# Patient Record
Sex: MALE | Race: WHITE | URBAN - METROPOLITAN AREA
[De-identification: names, ages, dates, MRNs, and addresses within clinical notes are randomized per-mention and may not be internally consistent; named-entity substitution may affect disease eponyms.]

---

## 2021-10-18 ENCOUNTER — OFFICE VISIT (OUTPATIENT)
Dept: URBAN - METROPOLITAN AREA CLINIC 121 | Facility: CLINIC | Age: 76
End: 2021-10-18

## 2022-04-15 ENCOUNTER — OFFICE VISIT (OUTPATIENT)
Dept: URBAN - METROPOLITAN AREA CLINIC 63 | Facility: CLINIC | Age: 77
End: 2022-04-15

## 2022-05-02 ENCOUNTER — OFFICE VISIT (OUTPATIENT)
Dept: URBAN - METROPOLITAN AREA SURGERY CENTER 4 | Facility: SURGERY CENTER | Age: 77
End: 2022-05-02

## 2022-05-04 LAB — PATHOLOGY (INDENTED REPORT): (no result)

## 2022-05-11 ENCOUNTER — OFFICE VISIT (OUTPATIENT)
Dept: URBAN - METROPOLITAN AREA CLINIC 63 | Facility: CLINIC | Age: 77
End: 2022-05-11

## 2022-07-09 ENCOUNTER — TELEPHONE ENCOUNTER (OUTPATIENT)
Dept: URBAN - METROPOLITAN AREA CLINIC 121 | Facility: CLINIC | Age: 77
End: 2022-07-09

## 2022-07-09 RX ORDER — OMEPRAZOLE 20 MG/1
CAPSULE, DELAYED RELEASE ORAL ONCE A DAY
Refills: 0 | OUTPATIENT
Start: 2022-04-15 | End: 2022-05-11

## 2022-07-09 RX ORDER — SIMVASTATIN 40 MG/1
TABLET, FILM COATED ORAL ONCE A DAY
Refills: 0 | OUTPATIENT
Start: 2022-04-15 | End: 2022-05-11

## 2022-07-09 RX ORDER — LEVOTHYROXINE SODIUM 100 UG/1
TABLET ORAL ONCE A DAY
Refills: 0 | OUTPATIENT
Start: 2022-04-15 | End: 2022-05-11

## 2022-07-09 RX ORDER — MELOXICAM 7.5 MG/1
TABLET ORAL ONCE A DAY
Refills: 0 | OUTPATIENT
Start: 2022-04-15 | End: 2022-05-11

## 2022-07-09 RX ORDER — DICLOFENAC SODIUM TOPICAL GEL, 1%, 10 MG/G
GEL TOPICAL
Refills: 0 | OUTPATIENT
Start: 2022-04-15 | End: 2022-05-11

## 2022-07-10 ENCOUNTER — TELEPHONE ENCOUNTER (OUTPATIENT)
Dept: URBAN - METROPOLITAN AREA CLINIC 121 | Facility: CLINIC | Age: 77
End: 2022-07-10

## 2022-07-10 RX ORDER — DICLOFENAC SODIUM TOPICAL GEL, 1%, 10 MG/G
GEL TOPICAL
Refills: 0 | Status: ACTIVE | COMMUNITY
Start: 2022-05-11

## 2022-07-10 RX ORDER — SIMVASTATIN 40 MG/1
TABLET, FILM COATED ORAL ONCE A DAY
Refills: 0 | Status: ACTIVE | COMMUNITY
Start: 2022-05-11

## 2022-07-10 RX ORDER — OMEPRAZOLE 20 MG/1
CAPSULE, DELAYED RELEASE ORAL ONCE A DAY
Refills: 0 | Status: ACTIVE | COMMUNITY
Start: 2022-05-11

## 2022-07-10 RX ORDER — LEVOTHYROXINE SODIUM 100 UG/1
TABLET ORAL ONCE A DAY
Refills: 0 | Status: ACTIVE | COMMUNITY
Start: 2022-05-11

## 2022-07-10 RX ORDER — MELOXICAM 7.5 MG/1
TABLET ORAL ONCE A DAY
Refills: 0 | Status: ACTIVE | COMMUNITY
Start: 2022-05-11

## 2022-08-02 ENCOUNTER — LAB OUTSIDE AN ENCOUNTER (OUTPATIENT)
Dept: URBAN - METROPOLITAN AREA CLINIC 63 | Facility: CLINIC | Age: 77
End: 2022-08-02

## 2022-08-03 LAB
A/G RATIO: 1.6
ABSOLUTE BASOPHILS: 53
ABSOLUTE EOSINOPHILS: 630
ABSOLUTE LYMPHOCYTES: 1943
ABSOLUTE MONOCYTES: 1092
ABSOLUTE NEUTROPHILS: 6783
ALBUMIN: 4.4
ALKALINE PHOSPHATASE: 65
ALT (SGPT): 24
AST (SGOT): 23
BASOPHILS: 0.5
BILIRUBIN, TOTAL: 0.8
BUN/CREATININE RATIO: 24
BUN: 16
CALCIUM: 9.6
CARBON DIOXIDE, TOTAL: 27
CHLORIDE: 102
CREATININE: 0.67
EGFR: 96
EOSINOPHILS: 6
GLOBULIN, TOTAL: 2.8
GLUCOSE: 104
HEMATOCRIT: 46.8
HEMOGLOBIN: 15.7
HEPATITIS A AB, TOTAL: (no result)
HEPATITIS B CORE AB TOTAL: (no result)
HEPATITIS B SURFACE ANTIGEN: (no result)
HEPATITIS B SURFACE: (no result)
HEPATITIS C ANTIBODY: (no result)
INDEX: 0.07
INR: 1.1
IRON BIND.CAP.(TIBC): 317
IRON SATURATION: 34
IRON: 107
LYMPHOCYTES: 18.5
MCH: 31.6
MCHC: 33.5
MCV: 94.2
MONOCYTES: 10.4
MPV: 11.4
NEUTROPHILS: 64.6
PLATELET COUNT: 282
POTASSIUM: 4.2
PROTEIN, TOTAL: 7.2
PT: 11.1
RDW: 13.9
RED BLOOD CELL COUNT: 4.97
REFLEX TIQ: (no result)
SODIUM: 139
WHITE BLOOD CELL COUNT: 10.5

## 2022-08-12 ENCOUNTER — OFFICE VISIT (OUTPATIENT)
Dept: URBAN - METROPOLITAN AREA CLINIC 63 | Facility: CLINIC | Age: 77
End: 2022-08-12
Payer: MEDICARE

## 2022-08-12 ENCOUNTER — WEB ENCOUNTER (OUTPATIENT)
Dept: URBAN - METROPOLITAN AREA CLINIC 63 | Facility: CLINIC | Age: 77
End: 2022-08-12

## 2022-08-12 VITALS
OXYGEN SATURATION: 97 % | RESPIRATION RATE: 16 BRPM | SYSTOLIC BLOOD PRESSURE: 128 MMHG | DIASTOLIC BLOOD PRESSURE: 68 MMHG | TEMPERATURE: 98.6 F | HEIGHT: 71 IN | WEIGHT: 205.9 LBS | BODY MASS INDEX: 28.82 KG/M2 | HEART RATE: 68 BPM

## 2022-08-12 DIAGNOSIS — K21.00 CHRONIC REFLUX ESOPHAGITIS: ICD-10-CM

## 2022-08-12 DIAGNOSIS — K59.04 CHRONIC IDIOPATHIC CONSTIPATION: ICD-10-CM

## 2022-08-12 DIAGNOSIS — K76.0 FATTY LIVER: ICD-10-CM

## 2022-08-12 PROCEDURE — 99214 OFFICE O/P EST MOD 30 MIN: CPT | Performed by: NURSE PRACTITIONER

## 2022-08-12 RX ORDER — OMEPRAZOLE 20 MG/1
CAPSULE, DELAYED RELEASE ORAL ONCE A DAY
Refills: 0 | Status: ACTIVE | COMMUNITY
Start: 2022-05-11

## 2022-08-12 RX ORDER — MELOXICAM 7.5 MG/1
TABLET ORAL ONCE A DAY
Refills: 0 | Status: ACTIVE | COMMUNITY
Start: 2022-05-11

## 2022-08-12 RX ORDER — SIMVASTATIN 40 MG/1
TABLET, FILM COATED ORAL ONCE A DAY
Refills: 0 | Status: ACTIVE | COMMUNITY
Start: 2022-05-11

## 2022-08-12 RX ORDER — LEVOTHYROXINE SODIUM 100 UG/1
TABLET ORAL ONCE A DAY
Refills: 0 | Status: ACTIVE | COMMUNITY
Start: 2022-05-11

## 2022-08-12 RX ORDER — DICLOFENAC SODIUM TOPICAL GEL, 1%, 10 MG/G
GEL TOPICAL
Refills: 0 | Status: ACTIVE | COMMUNITY
Start: 2022-05-11

## 2022-08-12 NOTE — HPI-PREVIOUS IMAGING
CT abdomen pelvis with contrast November 2020:  Fatty infiltration, hypodensity in segment 8/5 likely tiny cyst or hemangioma, normal gallbladder, normal pancreas normal spleen,  Prostatomegaly, bilateral inguinal hernias fat-containing, large hernia on the right side and moderately sized on the left Colonic diverticulosis

## 2022-08-12 NOTE — HPI-TODAY'S VISIT:
Naveed is a pleasant 76-year-old  who gets his medical care at [facility]. His brother was diagnosed in his late 60s with rectal cancer. Recent colonoscopy noted multiple adenomas. He takes stool softeners, metamucil and senna and despite that has significant constipation. Previously took miralax with no improvement. He denies any abdominal pain. Denies any rectal bleeding or melena.    Hx fatty liver , US enlarged liver, Fibroscan S3/F0. Neg viral hepatitis panel. Normal iron stores. Rare etoh use (1-2 per MONTH).    He takes omeprazole for his acid reflux and is currently asymptomatic on omeprazole. Denies any dysphagia.   reports history of dysphagia s/pdilation 12 years ago (no records). He has improved his diet and lost a few pounds. Denies use of NSAIDs.

## 2022-08-12 NOTE — HPI-PREVIOUS PROCEDURES
colonoscopy 5/2022 good prep, somewhat difficult due to multiple diverticula in the colon, normal terminal ileum, centimeter polyp in the cecum pathology tubular adenoma, 4 mm and 6 mm polyp in the splenic flexure pathology tubular adenoma, 5 mm polyp in the sigmoid colon pathology tubular adenoma, multiple medium mouth diverticula in the sigmoid and rectosigmoid colon, grade 2 hemorrhoids. Colon recall 3 years (2025). Reports an EGD with dilation 12 years ago at [facility].

## 2022-09-02 ENCOUNTER — TELEPHONE ENCOUNTER (OUTPATIENT)
Dept: URBAN - METROPOLITAN AREA CLINIC 63 | Facility: CLINIC | Age: 77
End: 2022-09-02

## 2022-09-02 RX ORDER — MELOXICAM 7.5 MG/1
TABLET ORAL ONCE A DAY
Refills: 0 | Status: ACTIVE | COMMUNITY
Start: 2022-05-11

## 2022-09-02 RX ORDER — LEVOTHYROXINE SODIUM 100 UG/1
TABLET ORAL ONCE A DAY
Refills: 0 | Status: ACTIVE | COMMUNITY
Start: 2022-05-11

## 2022-09-02 RX ORDER — LINACLOTIDE 145 UG/1
1 CAPSULE AT LEAST 30 MINUTES BEFORE THE FIRST MEAL OF THE DAY ON AN EMPTY STOMACH CAPSULE, GELATIN COATED ORAL ONCE A DAY
Qty: 90 | OUTPATIENT
Start: 2022-09-02 | End: 2022-11-30

## 2022-09-02 RX ORDER — DICLOFENAC SODIUM TOPICAL GEL, 1%, 10 MG/G
GEL TOPICAL
Refills: 0 | Status: ACTIVE | COMMUNITY
Start: 2022-05-11

## 2022-09-02 RX ORDER — SIMVASTATIN 40 MG/1
TABLET, FILM COATED ORAL ONCE A DAY
Refills: 0 | Status: ACTIVE | COMMUNITY
Start: 2022-05-11

## 2022-09-02 RX ORDER — OMEPRAZOLE 20 MG/1
CAPSULE, DELAYED RELEASE ORAL ONCE A DAY
Refills: 0 | Status: ACTIVE | COMMUNITY
Start: 2022-05-11

## 2022-10-10 ENCOUNTER — WEB ENCOUNTER (OUTPATIENT)
Dept: URBAN - METROPOLITAN AREA CLINIC 63 | Facility: CLINIC | Age: 77
End: 2022-10-10

## 2022-10-10 ENCOUNTER — DASHBOARD ENCOUNTERS (OUTPATIENT)
Age: 77
End: 2022-10-10

## 2022-10-10 ENCOUNTER — OFFICE VISIT (OUTPATIENT)
Dept: URBAN - METROPOLITAN AREA CLINIC 63 | Facility: CLINIC | Age: 77
End: 2022-10-10
Payer: MEDICARE

## 2022-10-10 VITALS
BODY MASS INDEX: 28.84 KG/M2 | HEART RATE: 90 BPM | OXYGEN SATURATION: 99 % | TEMPERATURE: 97.1 F | SYSTOLIC BLOOD PRESSURE: 142 MMHG | WEIGHT: 206 LBS | DIASTOLIC BLOOD PRESSURE: 90 MMHG | HEIGHT: 71 IN

## 2022-10-10 DIAGNOSIS — Z86.010 PERSONAL HISTORY OF COLONIC POLYPS: ICD-10-CM

## 2022-10-10 DIAGNOSIS — K76.0 FATTY (CHANGE OF) LIVER, NOT ELSEWHERE CLASSIFIED: ICD-10-CM

## 2022-10-10 DIAGNOSIS — K21.00 CHRONIC REFLUX ESOPHAGITIS: ICD-10-CM

## 2022-10-10 DIAGNOSIS — K21.9 GERD WITHOUT ESOPHAGITIS: ICD-10-CM

## 2022-10-10 DIAGNOSIS — Z80.0 FAMILY HISTORY OF MALIGNANT NEOPLASM OF DIGESTIVE ORGANS: ICD-10-CM

## 2022-10-10 DIAGNOSIS — K59.04 CHRONIC IDIOPATHIC CONSTIPATION: ICD-10-CM

## 2022-10-10 DIAGNOSIS — E66.3 OVERWEIGHT: ICD-10-CM

## 2022-10-10 PROBLEM — 266433003: Status: ACTIVE | Noted: 2022-08-12

## 2022-10-10 PROBLEM — 82934008: Status: ACTIVE | Noted: 2022-08-12

## 2022-10-10 PROBLEM — 197321007: Status: ACTIVE | Noted: 2022-08-12

## 2022-10-10 PROCEDURE — 99214 OFFICE O/P EST MOD 30 MIN: CPT | Performed by: NURSE PRACTITIONER

## 2022-10-10 RX ORDER — MELOXICAM 7.5 MG/1
TABLET ORAL ONCE A DAY
Refills: 0 | Status: ACTIVE | COMMUNITY
Start: 2022-05-11

## 2022-10-10 RX ORDER — SIMVASTATIN 40 MG/1
TABLET, FILM COATED ORAL ONCE A DAY
Refills: 0 | Status: ACTIVE | COMMUNITY
Start: 2022-05-11

## 2022-10-10 RX ORDER — DICLOFENAC SODIUM TOPICAL GEL, 1%, 10 MG/G
GEL TOPICAL
Refills: 0 | Status: ACTIVE | COMMUNITY
Start: 2022-05-11

## 2022-10-10 RX ORDER — LINACLOTIDE 145 UG/1
1 CAPSULE AT LEAST 30 MINUTES BEFORE THE FIRST MEAL OF THE DAY ON AN EMPTY STOMACH CAPSULE, GELATIN COATED ORAL ONCE A DAY
Qty: 90 | Status: ON HOLD | COMMUNITY
Start: 2022-09-02 | End: 2022-11-30

## 2022-10-10 RX ORDER — LEVOTHYROXINE SODIUM 100 UG/1
TABLET ORAL ONCE A DAY
Refills: 0 | Status: ACTIVE | COMMUNITY
Start: 2022-05-11

## 2022-10-10 RX ORDER — OMEPRAZOLE 20 MG/1
CAPSULE, DELAYED RELEASE ORAL ONCE A DAY
Refills: 0 | Status: ACTIVE | COMMUNITY
Start: 2022-05-11

## 2025-04-15 ENCOUNTER — TELEPHONE ENCOUNTER (OUTPATIENT)
Dept: URBAN - METROPOLITAN AREA CLINIC 63 | Facility: CLINIC | Age: 80
End: 2025-04-15